# Patient Record
(demographics unavailable — no encounter records)

---

## 2024-10-31 NOTE — ASSESSMENT
[FreeTextEntry1] : CKD with DIDI in setting of obstructing kidney stone-- resolved last check approximately to baseline BP borderline here was better on recent visits  has no albuminuria but might consider low dose ARB or SGLT2i marlena if BP remains in this  range weight loss strongly encouraged advised follow BP   discussed adequate fluid intake and low Na intake for stone prevention ( and low Na for BP vas well)  f/u 4-5 mos -- consider 24 hour urine for metabolic profile if any stone recurrence

## 2024-10-31 NOTE — CONSULT LETTER
[Dear  ___] : Dear  [unfilled], [Consult Letter:] : I had the pleasure of evaluating your patient, [unfilled]. [Please see my note below.] : Please see my note below. [Consult Closing:] : Thank you very much for allowing me to participate in the care of this patient.  If you have any questions, please do not hesitate to contact me. [Sincerely,] : Sincerely, [FreeTextEntry3] : Patrick Sena MD, ALFREDO Division of Nephrology [facility] Associate Professor of Medicine [facility]      [DrStella  ___] : Dr. NGO

## 2024-10-31 NOTE — CONSULT LETTER
[Dear  ___] : Dear  [unfilled], [Consult Letter:] : I had the pleasure of evaluating your patient, [unfilled]. [Please see my note below.] : Please see my note below. [Consult Closing:] : Thank you very much for allowing me to participate in the care of this patient.  If you have any questions, please do not hesitate to contact me. [Sincerely,] : Sincerely, [FreeTextEntry3] : Patrick Sena MD, ALFREDO Division of Nephrology [facility] Associate Professor of Medicine [facility]      [DrStella  ___] : Dr. NGO

## 2024-10-31 NOTE — HISTORY OF PRESENT ILLNESS
[FreeTextEntry1] : asked to see by Dr Dsouza for elevated creatinine/ CKD, PCP Dr Heller pt is a 68 yo B woman hx DM, HTN x years (controlled), obesity, recent calcium oxalate ureteral kidney stone removal in July, in August noted to have serum creatinine of 1.26 with prior 1.1 in Sept 2023  has no microalbuminuria  meds reviewed with pt and list updated reports BP and DM controlled  no complaints- no  sx since stent removal   addendum- labs obtained from  July with creat up to 2 in setting of obstructing kidney stone on CT

## 2025-02-27 NOTE — ASSESSMENT
[FreeTextEntry1] : 1. T2D A1C goal <7% A1C - 5.7% (2/27/25) from 6.4% (8/2024) from 7.4% (2/2023) from 6.1% (6/2022) Current regimen: Mounjaro 7.5mg/weekly Glucometer readings at home reveal fasting and postprandial BG at goal without hypoglycemia Glucometer reading performed in office today is at goal in fasting state  Emily has experienced enhanced glycemic control with switch from trulicity to Mounjaro and weight loss of 4lb.  She endorses tolerance to this regimen and has preference to trial decrease to 5mg/weekly given current glycemic control. -- decrease mounjaro to 5mg/weekly -- continue lifestyle modification -- schedule FU with podiatry and opthal   2. MNG, nontoxic Thyroid ultrasound in January 2023 demonstrated a right lower pole 3.8 x 2.0 x 2.9 cm hypoechoic nodule and a left lower pole 1.5 x 0.6 x 0.7 cm hypoechoic nodule meeting criteria for biopsy.  Euthyroid on annual TFT from August 2024 Repeat thyroid US in 9/11/24 with 2 nodules meeting criteria for FNA  9/25/24 S/p FNA or right lower pole 3.8cm nodule (bethesda I, non-diagnostic) and left lower pole 1.4cm nodule (bethesda II, benign) -- repeat FNA of right 3.8cm nodule that was previously non-diagnostic from FNA in 9/2024  Schedule repeat FNA, I will call with results Follow-up in 4-6 months  Holly Strauss MS, FNP-BC, [facility] 02/27/2025

## 2025-02-27 NOTE — REVIEW OF SYSTEMS
[Recent Weight Loss (___ Lbs)] : recent weight loss: [unfilled] lbs [Negative] : Psychiatric [Fatigue] : no fatigue [Dysphagia] : no dysphagia [Neck Pain] : no neck pain [Nausea] : no nausea [Constipation] : no constipation [Abdominal Pain] : no abdominal pain [Heartburn] : no heartburn [Vomiting] : no vomiting [Diarrhea] : no diarrhea [Polyuria] : no polyuria [Polydipsia] : no polydipsia

## 2025-02-27 NOTE — HISTORY OF PRESENT ILLNESS
[FreeTextEntry1] : SWATI LOVE is a 67 year old female with pmhx of T2D, HLD, nontoxic MNG who presents for T2D follow-up.   Patient of Dr. Dsouza, last visit, 8/16/2024  Interval change: Since last visit, switched from Trulicity to Mounjaro, titrating to 7.5mg/weekly. Tolerating mounjaro regimen Endorses issue at pharmacy when transition Would prefer to be on less medication, if possible to maintain glycemic control Did not have repeat FNA of right nodule found to be nondiagnostic on FNA from 9/25/24 yet    A1C - 5.7% (2/27/25) from 6.4% (8/2024) from 7.4% (2/2023) from 6.1% (6/2022) Office Fingerstick -- 98 (not fasting)   Current medication: - Mounjaro 7.5mg/weekly   Past medication: - Trulicity - metformin   SWATI reports they take their diabetes medication *** of the time. SWATI denies hypoglycemia symptoms or BG <70   Diabetes Self-Management: Monitoring BG 2-3x/weekly -- typically <100, never less than 70   Diet-- Typically consumes 1 midafternoon meal and small evening meal  No appetite in AM, predates GLP1ra Beverages: water   Ophthalmology: UTD, last visit 5/2024, follows annually Podiatry: >1 year ago.  Endorses some numbness in right foot, will FU with [facility], desires referral to for second option    2. Nontoxic multinodular goiter. She was noted to have a nodular goiter on physical examination. Thyroid ultrasound in January 2023 demonstrated a right lower pole 3.8 x 2.0 x 2.9 cm hypoechoic nodule and a left lower pole 1.5 x 0.6 x 0.7 cm hypoechoic nodule meeting criteria for biopsy. Repeat FNA in 9/11/25 with nodules, as above continuing to meet criteria for FNA S/p FNA of both nodules on 9/25/24 with right 3.8cm nodule found to be non-diagnostic and left 1.5cm nodule found to be bethesda I (benign) She has been biochemically euthyroid. No history of radiation exposure. No family history of thyroid cancer.

## 2025-02-27 NOTE — ASSESSMENT
[FreeTextEntry1] : 1. T2D A1C goal <7% A1C - 5.7% (2/27/25) from 6.4% (8/2024) from 7.4% (2/2023) from 6.1% (6/2022) Current regimen: Mounjaro 7.5mg/weekly Glucometer readings at home reveal fasting and postprandial BG at goal without hypoglycemia Glucometer reading performed in office today is at goal in fasting state  Emily has experienced enhanced glycemic control with switch from trulicity to Mounjaro and weight loss of 4lb.  She endorses tolerance to this regimen and has preference to trial decrease to 5mg/weekly given current glycemic control. -- decrease mounjaro to 5mg/weekly -- continue lifestyle modification -- schedule FU with podiatry and opthal   2. MNG, nontoxic Thyroid ultrasound in January 2023 demonstrated a right lower pole 3.8 x 2.0 x 2.9 cm hypoechoic nodule and a left lower pole 1.5 x 0.6 x 0.7 cm hypoechoic nodule meeting criteria for biopsy.  Euthyroid on annual TFT from August 2024 Repeat thyroid US in 9/11/24 with 2 nodules meeting criteria for FNA  9/25/24 S/p FNA or right lower pole 3.8cm nodule (bethesda I, non-diagnostic) and left lower pole 1.4cm nodule (bethesda II, benign) -- repeat FNA of right 3.8cm nodule that was previously non-diagnostic from FNA in 9/2024  Schedule repeat FNA, I will call with results Follow-up in 4-6 months  Holly Strauss MS, FNP-BC, [facility] 02/27/2025

## 2025-05-19 NOTE — HISTORY OF PRESENT ILLNESS
[FreeTextEntry1] : f/u mild CKD, HTN reports due to misunderstanding has been off HCTZ some time-- now back on  meds reviewed with pt and list updated labs done and reviewed - see below no complaints , no recurrecen kidney stone sx  PCP Dr Heller

## 2025-05-19 NOTE — PHYSICAL EXAM
[General Appearance - Alert] : alert [General Appearance - In No Acute Distress] : in no acute distress [Sclera] : the sclera and conjunctiva were normal [Jugular Venous Distention Increased] : there was no jugular-venous distention [Auscultation Breath Sounds / Voice Sounds] : lungs were clear to auscultation bilaterally [Heart Rate And Rhythm] : heart rate was normal and rhythm regular [Heart Sounds] : normal S1 and S2 [Edema] : there was no peripheral edema [Abdomen Soft] : soft [Abdomen Tenderness] : non-tender [Involuntary Movements] : no involuntary movements were seen [] : no rash [No Focal Deficits] : no focal deficits [Oriented To Time, Place, And Person] : oriented to person, place, and time [Affect] : the affect was normal

## 2025-05-19 NOTE — ASSESSMENT
[FreeTextEntry1] : creat below recent baseline- likely bec has been off HCTZ no  albuminuria  Bp borderline-- just restarted HCTZ . advised follow BP at home and also consider increase in GLP-1 agonist for weight loss. If BP remains suboptimal consider add ARB (or perhaps carvedilol with high HR) will add vit D for low level with high PTH which may be secondary to low vit D -- calcium nl f/u 4 mos with labs